# Patient Record
Sex: MALE | Race: BLACK OR AFRICAN AMERICAN | NOT HISPANIC OR LATINO | URBAN - METROPOLITAN AREA
[De-identification: names, ages, dates, MRNs, and addresses within clinical notes are randomized per-mention and may not be internally consistent; named-entity substitution may affect disease eponyms.]

---

## 2019-09-29 ENCOUNTER — EMERGENCY (EMERGENCY)
Facility: HOSPITAL | Age: 19
LOS: 1 days | Discharge: HOME | End: 2019-09-29
Attending: STUDENT IN AN ORGANIZED HEALTH CARE EDUCATION/TRAINING PROGRAM
Payer: COMMERCIAL

## 2019-09-29 VITALS
HEART RATE: 83 BPM | OXYGEN SATURATION: 98 % | WEIGHT: 145.06 LBS | HEIGHT: 68 IN | SYSTOLIC BLOOD PRESSURE: 122 MMHG | DIASTOLIC BLOOD PRESSURE: 82 MMHG | RESPIRATION RATE: 18 BRPM | TEMPERATURE: 99 F

## 2019-09-29 PROCEDURE — 99283 EMERGENCY DEPT VISIT LOW MDM: CPT

## 2019-09-29 RX ORDER — METHOCARBAMOL 500 MG/1
1500 TABLET, FILM COATED ORAL ONCE
Refills: 0 | Status: COMPLETED | OUTPATIENT
Start: 2019-09-29 | End: 2019-09-29

## 2019-09-29 RX ORDER — IBUPROFEN 200 MG
600 TABLET ORAL ONCE
Refills: 0 | Status: COMPLETED | OUTPATIENT
Start: 2019-09-29 | End: 2019-09-29
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
## 2019-09-29 NOTE — ED PROVIDER NOTE - PATIENT PORTAL LINK FT
You can access the FollowMyHealth Patient Portal offered by [facility] by registering at the following website: [email or web address]. By joining [facility]’s FollowMyHealth portal, you will also be able to view your health information using other applications (apps) compatible with our system.

## 2019-09-29 NOTE — ED PROVIDER NOTE - CLINICAL SUMMARY MEDICAL DECISION MAKING FREE TEXT BOX
No neuro deficits on exam, no midline spinal tenderness on exam, no LOC, pt ambulating, muscle relaxer and anti-inflammatories, follow up with pmd
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
## 2019-09-30 RX ORDER — METHOCARBAMOL 500 MG/1
1 TABLET, FILM COATED ORAL
Qty: 9 | Refills: 0
Start: 2019-09-30 | End: 2019-10-02

## 2019-09-30 RX ORDER — IBUPROFEN 200 MG
1 TABLET ORAL
Qty: 9 | Refills: 0
Start: 2019-09-30 | End: 2019-10-02

## 2023-03-09 ENCOUNTER — NON-APPOINTMENT (OUTPATIENT)
Age: 23
End: 2023-03-09

## 2024-01-03 ENCOUNTER — NON-APPOINTMENT (OUTPATIENT)
Age: 24
End: 2024-01-03

## 2024-02-02 ENCOUNTER — OFFICE (OUTPATIENT)
Dept: URBAN - METROPOLITAN AREA CLINIC 12 | Facility: CLINIC | Age: 24
Setting detail: OPHTHALMOLOGY
End: 2024-02-02
Payer: MEDICAID

## 2024-02-02 DIAGNOSIS — B30.9: ICD-10-CM

## 2024-02-02 PROCEDURE — 92012 INTRM OPH EXAM EST PATIENT: CPT | Performed by: OPTOMETRIST

## 2024-02-02 ASSESSMENT — CONFRONTATIONAL VISUAL FIELD TEST (CVF)
OD_FINDINGS: FULL
OS_FINDINGS: FULL

## 2024-02-02 ASSESSMENT — REFRACTION_CURRENTRX
OD_AXIS: 177
OD_VPRISM_DIRECTION: SV
OS_AXIS: 009
OS_VPRISM_DIRECTION: SV
OD_SPHERE: -1.50
OS_CYLINDER: -1.75
OS_OVR_VA: 20/
OS_SPHERE: -1.50
OD_CYLINDER: -1.50
OD_OVR_VA: 20/

## 2024-02-02 ASSESSMENT — REFRACTION_MANIFEST
OD_SPHERE: -2.00
OD_AXIS: 165
OD_CYLINDER: -2.25
OS_CYLINDER: -1.75
OS_AXIS: 005
OS_SPHERE: -2.00

## 2024-02-02 ASSESSMENT — REFRACTION_AUTOREFRACTION
OD_SPHERE: -2.00
OS_SPHERE: -2.00
OD_AXIS: 165
OD_CYLINDER: -2.25
OS_AXIS: 007
OS_CYLINDER: -1.75

## 2024-02-02 ASSESSMENT — SPHEQUIV_DERIVED
OS_SPHEQUIV: -2.875
OS_SPHEQUIV: -2.875
OD_SPHEQUIV: -3.125
OD_SPHEQUIV: -3.125

## 2024-02-06 ENCOUNTER — OFFICE (OUTPATIENT)
Dept: URBAN - METROPOLITAN AREA CLINIC 12 | Facility: CLINIC | Age: 24
Setting detail: OPHTHALMOLOGY
End: 2024-02-06
Payer: MEDICAID

## 2024-02-06 DIAGNOSIS — B30.9: ICD-10-CM

## 2024-02-06 PROCEDURE — 92012 INTRM OPH EXAM EST PATIENT: CPT | Performed by: STUDENT IN AN ORGANIZED HEALTH CARE EDUCATION/TRAINING PROGRAM

## 2024-02-06 ASSESSMENT — REFRACTION_CURRENTRX
OS_CYLINDER: -1.75
OS_OVR_VA: 20/
OD_AXIS: 177
OD_VPRISM_DIRECTION: SV
OD_OVR_VA: 20/
OS_VPRISM_DIRECTION: SV
OS_AXIS: 009
OD_CYLINDER: -1.50
OD_SPHERE: -1.50
OS_SPHERE: -1.50

## 2024-02-06 ASSESSMENT — SPHEQUIV_DERIVED
OS_SPHEQUIV: -3
OD_SPHEQUIV: -3.125
OD_SPHEQUIV: -3
OS_SPHEQUIV: -2.875

## 2024-02-06 ASSESSMENT — REFRACTION_AUTOREFRACTION
OD_CYLINDER: -2.50
OS_CYLINDER: -2.00
OD_SPHERE: -1.75
OS_SPHERE: -2.00
OS_AXIS: 007
OD_AXIS: 168

## 2024-02-06 ASSESSMENT — REFRACTION_MANIFEST
OD_AXIS: 165
OD_CYLINDER: -2.25
OS_AXIS: 005
OD_SPHERE: -2.00
OS_SPHERE: -2.00
OS_CYLINDER: -1.75

## 2024-02-06 ASSESSMENT — CONFRONTATIONAL VISUAL FIELD TEST (CVF)
OS_FINDINGS: FULL
OD_FINDINGS: FULL

## 2024-03-05 ENCOUNTER — OFFICE (OUTPATIENT)
Dept: URBAN - METROPOLITAN AREA CLINIC 12 | Facility: CLINIC | Age: 24
Setting detail: OPHTHALMOLOGY
End: 2024-03-05
Payer: MEDICAID

## 2024-03-05 DIAGNOSIS — H16.223: ICD-10-CM

## 2024-03-05 DIAGNOSIS — B30.9: ICD-10-CM

## 2024-03-05 PROCEDURE — 99213 OFFICE O/P EST LOW 20 MIN: CPT | Performed by: STUDENT IN AN ORGANIZED HEALTH CARE EDUCATION/TRAINING PROGRAM

## 2024-03-05 ASSESSMENT — SPHEQUIV_DERIVED
OD_SPHEQUIV: -3.125
OS_SPHEQUIV: -2.875

## 2024-03-05 ASSESSMENT — REFRACTION_MANIFEST
OD_CYLINDER: -2.25
OS_SPHERE: -2.00
OS_CYLINDER: -1.75
OD_AXIS: 165
OD_SPHERE: -2.00
OS_AXIS: 005

## 2024-03-05 ASSESSMENT — REFRACTION_CURRENTRX
OS_SPHERE: -1.50
OD_AXIS: 177
OD_OVR_VA: 20/
OS_CYLINDER: -1.75
OS_OVR_VA: 20/
OS_VPRISM_DIRECTION: SV
OD_VPRISM_DIRECTION: SV
OD_CYLINDER: -1.50
OD_SPHERE: -1.50
OS_AXIS: 009

## 2024-04-08 ENCOUNTER — OFFICE (OUTPATIENT)
Dept: URBAN - METROPOLITAN AREA CLINIC 12 | Facility: CLINIC | Age: 24
Setting detail: OPHTHALMOLOGY
End: 2024-04-08
Payer: MEDICAID

## 2024-04-08 DIAGNOSIS — H16.223: ICD-10-CM

## 2024-04-08 DIAGNOSIS — B30.9: ICD-10-CM

## 2024-04-08 PROCEDURE — 99213 OFFICE O/P EST LOW 20 MIN: CPT | Performed by: STUDENT IN AN ORGANIZED HEALTH CARE EDUCATION/TRAINING PROGRAM

## 2024-06-26 ENCOUNTER — NON-APPOINTMENT (OUTPATIENT)
Age: 24
End: 2024-06-26

## 2024-07-22 ENCOUNTER — APPOINTMENT (OUTPATIENT)
Age: 24
End: 2024-07-22

## 2024-07-31 ENCOUNTER — OFFICE (OUTPATIENT)
Dept: URBAN - METROPOLITAN AREA CLINIC 12 | Facility: CLINIC | Age: 24
Setting detail: OPHTHALMOLOGY
End: 2024-07-31
Payer: MEDICAID

## 2024-07-31 DIAGNOSIS — H16.223: ICD-10-CM

## 2024-07-31 PROCEDURE — 99213 OFFICE O/P EST LOW 20 MIN: CPT | Performed by: STUDENT IN AN ORGANIZED HEALTH CARE EDUCATION/TRAINING PROGRAM

## 2024-07-31 ASSESSMENT — CONFRONTATIONAL VISUAL FIELD TEST (CVF)
OS_FINDINGS: FULL
OD_FINDINGS: FULL

## 2025-03-30 ENCOUNTER — NON-APPOINTMENT (OUTPATIENT)
Age: 25
End: 2025-03-30